# Patient Record
Sex: FEMALE | URBAN - METROPOLITAN AREA
[De-identification: names, ages, dates, MRNs, and addresses within clinical notes are randomized per-mention and may not be internally consistent; named-entity substitution may affect disease eponyms.]

---

## 2021-09-10 ENCOUNTER — OFFICE VISIT (OUTPATIENT)
Dept: URBAN - METROPOLITAN AREA TELEHEALTH 2 | Facility: TELEHEALTH | Age: 41
End: 2021-09-10
Payer: COMMERCIAL

## 2021-09-10 ENCOUNTER — DASHBOARD ENCOUNTERS (OUTPATIENT)
Age: 41
End: 2021-09-10

## 2021-09-10 DIAGNOSIS — R19.7 ACUTE DIARRHEA: ICD-10-CM

## 2021-09-10 PROCEDURE — 99203 OFFICE O/P NEW LOW 30 MIN: CPT | Performed by: INTERNAL MEDICINE

## 2021-09-10 RX ORDER — B-COMPLEX WITH VITAMIN C
1 TABLET TABLET ORAL ONCE A DAY
Status: ACTIVE | COMMUNITY

## 2021-09-10 RX ORDER — FLUCONAZOLE 100 MG/1
1 TABLET TABLET ORAL
Status: ACTIVE | COMMUNITY

## 2021-09-10 RX ORDER — MULTIVIT-MIN/IRON/FOLIC ACID/K 18-600-40
AS DIRECTED CAPSULE ORAL
Status: ACTIVE | COMMUNITY

## 2021-09-10 NOTE — HPI-TODAY'S VISIT:
[facility] referree=d by Kenia Macias MD for evaluation of abdominal pain and diarrhea. Copy of this consult OV sent to Dr. Macias. This is a Telehealth OV to which patient has agreed to. Limitations of telehealth discussed; she understands and agrees to proceed. 41 yo pt w/ few weeks of diffuse pprandial abdominal pain-discomfort and non-bloody loose bm's, 3 to 5 x/d, w/o mucus, tenesmus, urgency nor incontinence. No nocturnal diarrhea. She has lost ~ 8 lbs  / 1 mo. No fever or chills. Seen @ [facility] 2 wks ago for sinusitis: Zyrtec / Prednisone / Amoxicillin /Ibuprofen.  Today, she reports some improvement in her diarrhea. No fever or chills. No constitutional sxs. Has been under stress due to her family situation in [place] after [place]. No recent labs.  No FHx CC / IBD. She has no other complaints. No COVID-19 exposure and has received 2 doses of COVID-19 vaccine.

## 2021-09-16 LAB
A/G RATIO: 1.2
ALBUMIN: 4
ALKALINE PHOSPHATASE: 148
ALT (SGPT): 20
AST (SGOT): 14
BILIRUBIN, TOTAL: 0.2
BUN/CREATININE RATIO: 10
BUN: 7
CALCIUM: 8.9
CARBON DIOXIDE, TOTAL: 21
CHLORIDE: 105
CREATININE: 0.67
EGFR IF AFRICN AM: 125
EGFR IF NONAFRICN AM: 109
FERRITIN, SERUM: 9
GLOBULIN, TOTAL: 3.3
GLUCOSE: 91
HEMATOCRIT: 28.2
HEMOGLOBIN: 8.4
IRON BIND.CAP.(TIBC): 419
IRON SATURATION: 3
IRON: 11
MCH: 20.8
MCHC: 29.8
MCV: 70
NRBC: (no result)
PLATELETS: 454
POTASSIUM: 3.9
PROTEIN, TOTAL: 7.3
RBC: 4.04
RDW: 17.2
SODIUM: 141
UIBC: 408
VITAMIN B12: 829
WBC: 8.7

## 2021-09-21 ENCOUNTER — TELEPHONE ENCOUNTER (OUTPATIENT)
Dept: URBAN - METROPOLITAN AREA CLINIC 92 | Facility: CLINIC | Age: 41
End: 2021-09-21

## 2021-09-21 PROBLEM — 724556004: Status: ACTIVE | Noted: 2021-09-21

## 2021-09-21 RX ORDER — SODIUM SULFATE, MAGNESIUM SULFATE, AND POTASSIUM CHLORIDE 17.75; 2.7; 2.25 G/1; G/1; G/1
12 TABLETS TABLET ORAL
Qty: 24 TABLETS | Refills: 0 | OUTPATIENT
Start: 2021-09-29 | End: 2021-09-30

## 2021-09-23 ENCOUNTER — WEB ENCOUNTER (OUTPATIENT)
Dept: URBAN - METROPOLITAN AREA CLINIC 98 | Facility: CLINIC | Age: 41
End: 2021-09-23

## 2021-09-23 RX ORDER — FLUCONAZOLE 100 MG/1
1 TABLET TABLET ORAL
Status: ACTIVE | COMMUNITY

## 2021-09-23 RX ORDER — SODIUM SULFATE, MAGNESIUM SULFATE, AND POTASSIUM CHLORIDE 17.75; 2.7; 2.25 G/1; G/1; G/1
12 TABLETS TABLET ORAL
Qty: 24 TABLETS | Refills: 0 | OUTPATIENT
Start: 2021-09-24 | End: 2021-09-25

## 2021-09-23 RX ORDER — MULTIVIT-MIN/IRON/FOLIC ACID/K 18-600-40
AS DIRECTED CAPSULE ORAL
Status: ACTIVE | COMMUNITY

## 2021-09-23 RX ORDER — B-COMPLEX WITH VITAMIN C
1 TABLET TABLET ORAL ONCE A DAY
Status: ACTIVE | COMMUNITY

## 2021-09-24 LAB
C DIFFICILE TOXIN GENE NAA: NEGATIVE
C DIFFICILE TOXINS A+B, EIA: NEGATIVE
CALPROTECTIN, FECAL: 651
CAMPYLOBACTER CULTURE: (no result)
E COLI SHIGA TOXIN EIA: NEGATIVE
Lab: (no result)
OVA + PARASITE EXAM: (no result)
PANCREATIC ELASTASE, FECAL: 361
SALMONELLA/SHIGELLA SCREEN: (no result)

## 2021-09-26 ENCOUNTER — TELEPHONE ENCOUNTER (OUTPATIENT)
Dept: URBAN - METROPOLITAN AREA CLINIC 92 | Facility: CLINIC | Age: 41
End: 2021-09-26

## 2021-09-26 RX ORDER — MULTIVIT-MIN/IRON/FOLIC ACID/K 18-600-40
AS DIRECTED CAPSULE ORAL
Status: ACTIVE | COMMUNITY

## 2021-09-26 RX ORDER — B-COMPLEX WITH VITAMIN C
1 TABLET TABLET ORAL ONCE A DAY
Status: ACTIVE | COMMUNITY

## 2021-09-26 RX ORDER — SODIUM SULFATE, MAGNESIUM SULFATE, AND POTASSIUM CHLORIDE 17.75; 2.7; 2.25 G/1; G/1; G/1
12 TABLETS TABLET ORAL
Qty: 24 TABLETS | Refills: 0 | OUTPATIENT
Start: 2021-09-26 | End: 2021-09-27

## 2021-09-26 RX ORDER — FLUCONAZOLE 100 MG/1
1 TABLET TABLET ORAL
Status: ACTIVE | COMMUNITY

## 2021-09-29 ENCOUNTER — TELEPHONE ENCOUNTER (OUTPATIENT)
Dept: URBAN - METROPOLITAN AREA CLINIC 92 | Facility: CLINIC | Age: 41
End: 2021-09-29

## 2021-09-29 LAB
5' NUCLEOTIDASE: 3
DEAMIDATED GLIADIN ABS, IGA: 9
DEAMIDATED GLIADIN ABS, IGG: 2
ENDOMYSIAL ANTIBODY IGA: NEGATIVE
GGT: 35
IMMUNOGLOBULIN A, QN, SERUM: 267
MITOCHONDRIAL (M2) ANTIBODY: <20
T-TRANSGLUTAMINASE (TTG) IGA: <2
T-TRANSGLUTAMINASE (TTG) IGG: 7
T4,FREE(DIRECT): 1.32
TSH: 2.07
VITAMIN B12: 1464

## 2021-09-29 RX ORDER — SODIUM SULFATE, MAGNESIUM SULFATE, AND POTASSIUM CHLORIDE 17.75; 2.7; 2.25 G/1; G/1; G/1
12 TABLETS TABLET ORAL
Qty: 24 TABLETS | Refills: 0 | OUTPATIENT
Start: 2021-09-29 | End: 2021-09-30

## 2021-11-03 ENCOUNTER — WEB ENCOUNTER (OUTPATIENT)
Dept: URBAN - METROPOLITAN AREA CLINIC 98 | Facility: CLINIC | Age: 41
End: 2021-11-03

## 2021-11-04 ENCOUNTER — TELEPHONE ENCOUNTER (OUTPATIENT)
Dept: URBAN - METROPOLITAN AREA CLINIC 40 | Facility: CLINIC | Age: 41
End: 2021-11-04

## 2021-11-08 ENCOUNTER — OFFICE VISIT (OUTPATIENT)
Dept: URBAN - METROPOLITAN AREA SURGERY CENTER 18 | Facility: SURGERY CENTER | Age: 41
End: 2021-11-08
Payer: COMMERCIAL

## 2021-11-08 ENCOUNTER — TELEPHONE ENCOUNTER (OUTPATIENT)
Dept: URBAN - METROPOLITAN AREA CLINIC 92 | Facility: CLINIC | Age: 41
End: 2021-11-08

## 2021-11-08 DIAGNOSIS — R19.7 ACUTE DIARRHEA: ICD-10-CM

## 2021-11-08 DIAGNOSIS — Z53.8 FAILED ATTEMPTED SURGICAL PROCEDURE: ICD-10-CM

## 2021-11-08 PROCEDURE — G8907 PT DOC NO EVENTS ON DISCHARG: HCPCS | Performed by: INTERNAL MEDICINE

## 2021-11-08 PROCEDURE — 45380 COLONOSCOPY AND BIOPSY: CPT | Performed by: INTERNAL MEDICINE

## 2021-11-08 RX ORDER — SODIUM, POTASSIUM,MAG SULFATES 17.5-3.13G
354ML SOLUTION, RECONSTITUTED, ORAL ORAL
Qty: 345 MILLILITER | Refills: 0 | OUTPATIENT
Start: 2021-11-08 | End: 2021-11-09

## 2021-11-08 RX ORDER — B-COMPLEX WITH VITAMIN C
1 TABLET TABLET ORAL ONCE A DAY
Status: ACTIVE | COMMUNITY

## 2021-11-08 RX ORDER — FLUCONAZOLE 100 MG/1
1 TABLET TABLET ORAL
Status: ACTIVE | COMMUNITY

## 2021-11-08 RX ORDER — MULTIVIT-MIN/IRON/FOLIC ACID/K 18-600-40
AS DIRECTED CAPSULE ORAL
Status: ACTIVE | COMMUNITY

## 2022-01-13 ENCOUNTER — OFFICE VISIT (OUTPATIENT)
Dept: URBAN - METROPOLITAN AREA SURGERY CENTER 18 | Facility: SURGERY CENTER | Age: 42
End: 2022-01-13

## 2024-10-17 ENCOUNTER — OFFICE VISIT (OUTPATIENT)
Dept: URGENT CARE | Facility: CLINIC | Age: 44
End: 2024-10-17
Payer: COMMERCIAL

## 2024-10-17 VITALS
RESPIRATION RATE: 18 BRPM | OXYGEN SATURATION: 99 % | WEIGHT: 230 LBS | TEMPERATURE: 98 F | SYSTOLIC BLOOD PRESSURE: 130 MMHG | BODY MASS INDEX: 36.1 KG/M2 | HEIGHT: 67 IN | DIASTOLIC BLOOD PRESSURE: 83 MMHG | HEART RATE: 102 BPM

## 2024-10-17 DIAGNOSIS — H66.91 RIGHT OTITIS MEDIA, UNSPECIFIED OTITIS MEDIA TYPE: ICD-10-CM

## 2024-10-17 DIAGNOSIS — H92.01 RIGHT EAR PAIN: Primary | ICD-10-CM

## 2024-10-17 DIAGNOSIS — H61.22 IMPACTED CERUMEN OF LEFT EAR: ICD-10-CM

## 2024-10-17 LAB
CTP QC/QA: YES
SARS-COV-2 AG RESP QL IA.RAPID: NEGATIVE

## 2024-10-17 PROCEDURE — 87811 SARS-COV-2 COVID19 W/OPTIC: CPT | Mod: QW,S$GLB,, | Performed by: FAMILY MEDICINE

## 2024-10-17 PROCEDURE — 99214 OFFICE O/P EST MOD 30 MIN: CPT | Mod: S$GLB,,, | Performed by: FAMILY MEDICINE

## 2024-10-17 RX ORDER — ZINC GLUCONATE 100 MG
TABLET ORAL
COMMUNITY
End: 2024-10-17 | Stop reason: ALTCHOICE

## 2024-10-17 RX ORDER — FLUCONAZOLE 150 MG/1
150 TABLET ORAL ONCE
Qty: 1 TABLET | Refills: 0 | Status: SHIPPED | OUTPATIENT
Start: 2024-10-17 | End: 2024-10-17

## 2024-10-17 RX ORDER — AMOXICILLIN 875 MG/1
875 TABLET, FILM COATED ORAL 2 TIMES DAILY
Qty: 14 TABLET | Refills: 0 | Status: SHIPPED | OUTPATIENT
Start: 2024-10-17 | End: 2024-10-24

## 2024-10-17 RX ORDER — MULTIVIT-MIN/IRON/FOLIC ACID/K 18-600-40
CAPSULE ORAL
COMMUNITY
End: 2024-10-17 | Stop reason: ALTCHOICE

## 2024-10-17 RX ORDER — SILDENAFIL 25 MG/1
TABLET, FILM COATED ORAL
COMMUNITY
Start: 2024-05-24 | End: 2024-10-17 | Stop reason: ALTCHOICE

## 2024-10-17 RX ORDER — PENTOXIFYLLINE 400 MG/1
400 TABLET, EXTENDED RELEASE ORAL 3 TIMES DAILY
COMMUNITY
Start: 2024-05-10 | End: 2024-10-17 | Stop reason: ALTCHOICE

## 2024-10-17 RX ORDER — ESTRADIOL 2 MG/1
1 TABLET ORAL 3 TIMES DAILY
COMMUNITY
End: 2024-10-17 | Stop reason: ALTCHOICE

## 2024-10-17 NOTE — PROGRESS NOTES
"Subjective:      Patient ID: Luis E Peña is a 44 y.o. female.    Vitals:  height is 5' 7" (1.702 m) and weight is 104.3 kg (230 lb). Her oral temperature is 98.2 °F (36.8 °C). Her blood pressure is 130/83 and her pulse is 102. Her respiration is 18 and oxygen saturation is 99%.     Chief Complaint: Otalgia    Patient presents with right ear pain, post nasal drip, ear popped just flew in from [place]. Onset 2 day. Otc Claritin. Pt denies fever, chills, CP, SOB, weakness/dizziness, N/V, diarrhea, abdominal pain, dysuria, loss of smell or taste.        Otalgia   There is pain in the right ear. This is a new problem. The current episode started yesterday. There has been no fever. The pain is at a severity of 5/10. The pain is mild. Pertinent negatives include no coughing. The treatment provided no relief.       HENT:  Positive for ear pain.    Respiratory:  Negative for cough.       Objective:     Physical Exam   Constitutional: She is oriented to person, place, and time. She appears well-developed. She is cooperative.  Non-toxic appearance. She does not appear ill. No distress.   HENT:   Head: Normocephalic and atraumatic.   Ears:   Right Ear: Hearing, external ear and ear canal normal. no impacted cerumen  Left Ear: Hearing, tympanic membrane, external ear and ear canal normal. impacted cerumen     Comments:   +ve R TM erythema and mild bulging  Nose: Congestion present. No mucosal edema, rhinorrhea or nasal deformity. No epistaxis. Right sinus exhibits no maxillary sinus tenderness and no frontal sinus tenderness. Left sinus exhibits no maxillary sinus tenderness and no frontal sinus tenderness.   Mouth/Throat: Uvula is midline, oropharynx is clear and moist and mucous membranes are normal. No trismus in the jaw. Normal dentition. No uvula swelling. No oropharyngeal exudate, posterior oropharyngeal edema or posterior oropharyngeal erythema.   Eyes: Conjunctivae and lids are normal. No scleral icterus. "   Neck: Trachea normal and phonation normal. Neck supple. No edema present. No erythema present. No neck rigidity present.   Cardiovascular: Normal rate, regular rhythm, normal heart sounds and normal pulses.   Pulmonary/Chest: Effort normal and breath sounds normal. No respiratory distress. She has no decreased breath sounds. She has no rhonchi.   Abdominal: Normal appearance.   Musculoskeletal: Normal range of motion.         General: No deformity. Normal range of motion.      Cervical back: She exhibits no tenderness.   Neurological: She is alert and oriented to person, place, and time. She exhibits normal muscle tone. Coordination normal.   Skin: Skin is warm, dry, intact, not diaphoretic and not pale.   Psychiatric: Her speech is normal and behavior is normal. Judgment and thought content normal.   Nursing note and vitals reviewed.      Assessment:     1. Right ear pain    2. Impacted cerumen of left ear    3. Right otitis media, unspecified otitis media type        Plan:   Discussed exam findings/results/diagnosis/plan with patient. Advised to f/u with PCP within 2-5 days. ER precautions given if symptoms get any worse. All questions answered. Patient verbally understood and agreed with treatment plan.  Educational materials and instructions regarding the visit diagnosis and management provided.     Right ear pain  -     SARS Coronavirus 2 Antigen, POCT Manual Read    Impacted cerumen of left ear  -     Ear wax removal    Right otitis media, unspecified otitis media type    Other orders  -     amoxicillin (AMOXIL) 875 MG tablet; Take 1 tablet (875 mg total) by mouth 2 (two) times daily. for 7 days  Dispense: 14 tablet; Refill: 0  -     fluconazole (DIFLUCAN) 150 MG Tab; Take 1 tablet (150 mg total) by mouth once. for 1 dose  Dispense: 1 tablet; Refill: 0